# Patient Record
Sex: MALE | Race: WHITE | NOT HISPANIC OR LATINO | Employment: FULL TIME | URBAN - METROPOLITAN AREA
[De-identification: names, ages, dates, MRNs, and addresses within clinical notes are randomized per-mention and may not be internally consistent; named-entity substitution may affect disease eponyms.]

---

## 2023-09-17 ENCOUNTER — OFFICE VISIT (OUTPATIENT)
Dept: URGENT CARE | Facility: URGENT CARE | Age: 43
End: 2023-09-17
Payer: COMMERCIAL

## 2023-09-17 VITALS
HEIGHT: 75 IN | BODY MASS INDEX: 27.23 KG/M2 | DIASTOLIC BLOOD PRESSURE: 63 MMHG | SYSTOLIC BLOOD PRESSURE: 111 MMHG | OXYGEN SATURATION: 97 % | WEIGHT: 219 LBS | HEART RATE: 86 BPM | TEMPERATURE: 97.9 F

## 2023-09-17 DIAGNOSIS — U07.1 INFECTION DUE TO 2019 NOVEL CORONAVIRUS: Primary | ICD-10-CM

## 2023-09-17 PROCEDURE — 99203 OFFICE O/P NEW LOW 30 MIN: CPT

## 2023-09-17 RX ORDER — GUAIFENESIN AND DEXTROMETHORPHAN HYDROBROMIDE 600; 30 MG/1; MG/1
1 TABLET, EXTENDED RELEASE ORAL EVERY 12 HOURS
COMMUNITY

## 2023-09-17 RX ORDER — IBUPROFEN 200 MG
TABLET ORAL
COMMUNITY

## 2023-09-17 NOTE — LETTER
September 17, 2023      Gregg Alvarado  [address] [facility] [id number]        To Whom It May Concern:    Gregg Alvarado  was seen on September 17, 2023.  Please excuse him from work until September 21st due to illness.        Sincerely,        ALYSSA Torres CNP

## 2023-09-17 NOTE — PATIENT INSTRUCTIONS
Continue to take Paxlovid as was prescribed to you.  Get plenty of rest and drink fluids.  Can use Tylenol and/or ibuprofen as needed for pain.  Maximum dose of Tylenol is 4000mg in a 24 hour period of time.  Take ibuprofen with food to avoid stomach upset.  You can also try warm salt water gargles, hot/warm water or tea with honey and/or lemon and/or Cepacol lozenges or spray for your sore throat.  You can try Flonase in the right nostril for your right ear symptoms.      
<-- Click to add NO pertinent Family History

## 2023-09-17 NOTE — PROGRESS NOTES
"ASSESSMENT:  (U07.1) Infection due to 2019 novel coronavirus  (primary encounter diagnosis)    PLAN:  Informed the patient to continue to take Paxlovid as was prescribed to him.  We discussed the need for him to get plenty of rest, drink fluids and use Tylenol and or ibuprofen as needed for pain with max dose of Tylenol being 4000 mg in 24 period time and to take ibuprofen with food to avoid upset stomach.  We also discussed trying warm salt water gargles, hot/warm water or tea with honey under lemon and or Cepacol lozenges or spray for sore throat.  Informed him he can try Flonase in the right nostril for his right ear symptoms.  Work note provided.  Discussed the need to return to clinic with any new or worsening symptoms.  Patient acknowledged his understanding of the above plan.    The use of Dragon/Opt[employer] services may have been used to construct the content in this note; any grammatical or spelling errors are non-intentional. Please contact the author of this note directly if you are in need of any clarification.      ALYSSA Torres CNP      SUBJECTIVE:   Gregg Alvarado is a 43 year old male presenting with a chief complaint of ear pain bilateral, dry cough and sore throat.  Onset of symptoms was 4 day(s) ago.  Course of illness is worsening.    Patient denies: vomiting and diarrhea  Treatment measures tried include Paxlovid, ibuprofen, Benadryl, Tylenol and Mucinex.  Predisposing factors include ill contact: Work.    ROS:  Negative except noted above.    OBJECTIVE:  /63   Pulse 86   Temp 97.9  F (36.6  C) (Temporal)   Ht 1.905 m (6' 3\")   Wt 99.3 kg (219 lb)   SpO2 97%   BMI 27.37 kg/m    GENERAL APPEARANCE: healthy, alert and no distress  EYES: EOMI,  PERRL, conjunctiva clear  HENT: ear canals and TM's normal.  Nose and mouth without ulcers, erythema or lesions  NECK: supple, nontender, no lymphadenopathy  RESP: lungs clear to auscultation - no rales, rhonchi or " wheezes  CV: regular rates and rhythm, normal S1 S2, no murmur noted  SKIN: no suspicious lesions or rashes